# Patient Record
Sex: MALE | URBAN - METROPOLITAN AREA
[De-identification: names, ages, dates, MRNs, and addresses within clinical notes are randomized per-mention and may not be internally consistent; named-entity substitution may affect disease eponyms.]

---

## 2021-05-22 ENCOUNTER — HOSPITAL ENCOUNTER (EMERGENCY)
Facility: CLINIC | Age: 26
Discharge: LEFT BEFORE DISCHARGE | End: 2021-05-23

## 2021-05-22 VITALS
HEART RATE: 102 BPM | WEIGHT: 240 LBS | SYSTOLIC BLOOD PRESSURE: 164 MMHG | DIASTOLIC BLOOD PRESSURE: 96 MMHG | TEMPERATURE: 97.6 F | OXYGEN SATURATION: 99 % | HEIGHT: 76 IN | BODY MASS INDEX: 29.22 KG/M2

## 2021-05-22 ASSESSMENT — MIFFLIN-ST. JEOR: SCORE: 2165.13

## 2021-05-23 NOTE — ED TRIAGE NOTES
Pt took an extenze pill for erection purpose.     Had shaking, cold, sob, dizzy, nauseous, lightheaded and heart racing but is feeling better now.    Pt A&O x 3, CMS x 3, ABCD's adequate in triage